# Patient Record
Sex: FEMALE | Race: AMERICAN INDIAN OR ALASKA NATIVE
[De-identification: names, ages, dates, MRNs, and addresses within clinical notes are randomized per-mention and may not be internally consistent; named-entity substitution may affect disease eponyms.]

---

## 2018-04-03 ENCOUNTER — HOSPITAL ENCOUNTER (EMERGENCY)
Dept: HOSPITAL 31 - C.ER | Age: 28
Discharge: HOME | End: 2018-04-03
Payer: COMMERCIAL

## 2018-04-03 VITALS
TEMPERATURE: 97.9 F | RESPIRATION RATE: 18 BRPM | OXYGEN SATURATION: 96 % | HEART RATE: 104 BPM | SYSTOLIC BLOOD PRESSURE: 124 MMHG | DIASTOLIC BLOOD PRESSURE: 70 MMHG

## 2018-04-03 VITALS — BODY MASS INDEX: 23 KG/M2

## 2018-04-03 DIAGNOSIS — R21: Primary | ICD-10-CM

## 2018-04-03 PROCEDURE — 99284 EMERGENCY DEPT VISIT MOD MDM: CPT

## 2018-04-03 PROCEDURE — 96372 THER/PROPH/DIAG INJ SC/IM: CPT

## 2018-04-03 NOTE — C.PDOC
History Of Present Illness


26yo female, presents to ED for evaluation of "hives all over my body" 

associated with redness and itchiness for the last three days. Pt notes she 

started Augmentin and Tessalon perles last week while being treated for a "bad 

cold" by her PCP. She stopped taking the Augmentin and Tesalon Perles Sunday 

morning and developed a rash that day. Patient states she was evaluated at an 

urgent care center yesterday for same complaints, given prednisone of which she 

took 40mg without relief of symptoms, prompting her visit. Denies taking any 

antihistamines. She denies any shortness of breath, mouth swelling, throat 

swelling, difficulty swallowing. No fever.  


Time Seen by Provider: 04/03/18 12:00


Chief Complaint (Nursing): Allergic Reaction


History Per: Patient


History/Exam Limitations: no limitations


Onset/Duration Of Symptoms: Days (3)


Current Symptoms Are (Timing): Still Present


Possible Cause: Medication


Associated Symptoms: Skin Rash, Itching, Redness


Home/EMS Treatment: Steroids


Additional History Per: Patient





Past Medical History


Reviewed: Historical Data, Nursing Documentation, Vital Signs


Vital Signs: 


 Last Vital Signs











Temp  97.9 F   04/03/18 11:36


 


Pulse  104 H  04/03/18 11:36


 


Resp  18   04/03/18 11:36


 


BP  124/70   04/03/18 11:36


 


Pulse Ox  96   04/03/18 12:25














- Medical History


PMH: Anemia


Surgical History: No Surg Hx


Family History: States: Unknown Family Hx





- Social History


Hx Tobacco Use: No


Hx Alcohol Use: No


Hx Substance Use: No





- Immunization History


Hx Tetanus Toxoid Vaccination: No


Hx Influenza Vaccination: No


Hx Pneumococcal Vaccination: No





Review Of Systems


Except As Marked, All Systems Reviewed And Found Negative.


ENT: Negative for: Mouth Swelling, Throat Swelling


Respiratory: Negative for: Shortness of Breath


Skin: Positive for: Rash





Physical Exam





- Physical Exam


Appears: Non-toxic, No Acute Distress


Skin: Dry, Other ((+) mild erythema noted to right lower quadrant of abdomen 

and on bilateral posterior knees secondary to scratching . No hives, papules, 

vesicles or discharge notes. )


Head: Atraumatic, Normacephalic


Eye(s): bilateral: Normal Inspection, PERRL, EOMI


Ear(s): Bilateral: Normal


Nose: Normal


Oral Mucosa: Moist


Tongue: No Swelling


Lips: No Swelling


Throat: Normal, No Erythema, No Exudate, No Drooling


Neck: Normal, Normal ROM, Supple


Chest: Symmetrical


Cardiovascular: Rhythm Regular, No Murmur


Respiratory: Normal Breath Sounds, No Accessory Muscle Use, No Wheezing


Gastrointestinal/Abdominal: Normal Exam, Soft, No Tenderness


Back: Normal Inspection


Extremity: Normal ROM


Neurological/Psych: Oriented x3, Normal Speech





ED Course And Treatment


O2 Sat by Pulse Oximetry: 96 (RA)


Pulse Ox Interpretation: Normal


Progress Note: Patient given Benadryl, Solumedrol and Pepcid in ER.  On re-

evaluation, patient is resting comfortably, tolerating PO, has no shortness of 

breath, has no intra-oral swelling, no stridor. Patient notes that pruritus has 

improved.. Patient was advised to avoid potential allergens, and to follow up 

with physician in 1-2 days.  Instructed to continue using Prednisone as 

prescribed and to take Benadryl every 6 hours for relief of symptoms. Given 

referral for an allergist follow up and instructed to return to ED if symptoms 

worsen.





Disposition





- Disposition


Disposition: HOME/ ROUTINE


Disposition Time: 12:14


Condition: STABLE


Additional Instructions: 


Continue the prednisone prescribed yesterday and start taking Benadryl every 6  

hours. Follow up with an allergist or dermatologist in 1-2 days. Return to ER 

if symptoms persist or worsen. 


Prescriptions: 


DiphenhydrAMINE [Benadryl] 25 mg PO Q6 #20 cap


Famotidine [Pepcid] 20 mg PO BID #10 tab


Instructions:  Skin Rash (DC)


Forms:  [facility] (English)





- Clinical Impression


Clinical Impression: 


 Rash








- PA / NP / Resident Statement


MD/DO has reviewed & agrees with the documentation as recorded.





- Scribe Statement


The provider has reviewed the documentation as recorded by the Scribe (Keturah Ernst)


Provider Attestation:


All medical record entries made by the Scribe were at my direction and 

personally dictated by me. I have reviewed the chart and agree that the record 

accurately reflects my personal performance of the history, physical exam, 

medical decision making, and the department course for this patient. I have 

also personally directed, reviewed, and agree with the discharge instructions 

and disposition.

## 2023-02-16 VITALS
WEIGHT: 130.95 LBS | OXYGEN SATURATION: 100 % | TEMPERATURE: 98 F | SYSTOLIC BLOOD PRESSURE: 109 MMHG | HEIGHT: 63 IN | HEART RATE: 104 BPM | RESPIRATION RATE: 18 BRPM | DIASTOLIC BLOOD PRESSURE: 70 MMHG

## 2023-02-16 NOTE — ASU PATIENT PROFILE, ADULT - REASON FOR ADMISSION, PROFILE
Robotic excision fo endometriosis tissue, cervical biopsy, laparoscopic lower anterior resection with possible shave excision rectal nodule ,

## 2023-02-16 NOTE — ASU PATIENT PROFILE, ADULT - NSICDXPASTMEDICALHX_GEN_ALL_CORE_FT
PAST MEDICAL HISTORY:  Asthma as a child    Endometriosis     Kidney infection     Uterine fibroid

## 2023-02-16 NOTE — ASU PATIENT PROFILE, ADULT - FALL HARM RISK - UNIVERSAL INTERVENTIONS
Bed in lowest position, wheels locked, appropriate side rails in place/Call bell, personal items and telephone in reach/Instruct patient to call for assistance before getting out of bed or chair/Non-slip footwear when patient is out of bed/[place] to call system/Physically safe environment - no spills, clutter or unnecessary equipment/Purposeful Proactive Rounding/Room/bathroom lighting operational, light cord in reach

## 2023-02-16 NOTE — ASU PATIENT PROFILE, ADULT - NSICDXPASTSURGICALHX_GEN_ALL_CORE_FT
PAST SURGICAL HISTORY:  Elective surgery benign mass removal from b/l breast    Elective surgery pinky finger surgery, right    H/O myomectomy

## 2023-02-16 NOTE — ASU PATIENT PROFILE, ADULT - NS PRO AD PATIENT TYPE
brother Paolo [facility], [phone]/Health Care Proxy (HCP)

## 2023-02-17 ENCOUNTER — INPATIENT (INPATIENT)
Facility: HOSPITAL | Age: 33
LOS: 3 days | Discharge: HOME | DRG: 330 | End: 2023-02-21
Attending: SURGERY | Admitting: SURGERY
Payer: COMMERCIAL

## 2023-02-17 DIAGNOSIS — Z41.9 ENCOUNTER FOR PROCEDURE FOR PURPOSES OTHER THAN REMEDYING HEALTH STATE, UNSPECIFIED: Chronic | ICD-10-CM

## 2023-02-17 DIAGNOSIS — Z98.890 OTHER SPECIFIED POSTPROCEDURAL STATES: Chronic | ICD-10-CM

## 2023-02-17 LAB
ANION GAP SERPL CALC-SCNC: 11 MMOL/L — SIGNIFICANT CHANGE UP (ref 5–17)
APTT BLD: 33.7 SEC — SIGNIFICANT CHANGE UP (ref 27.5–35.5)
BLD GP AB SCN SERPL QL: NEGATIVE — SIGNIFICANT CHANGE UP
BUN SERPL-MCNC: 9 MG/DL — SIGNIFICANT CHANGE UP (ref 7–23)
CALCIUM SERPL-MCNC: 8.6 MG/DL — SIGNIFICANT CHANGE UP (ref 8.4–10.5)
CHLORIDE SERPL-SCNC: 102 MMOL/L — SIGNIFICANT CHANGE UP (ref 96–108)
CO2 SERPL-SCNC: 24 MMOL/L — SIGNIFICANT CHANGE UP (ref 22–31)
CREAT SERPL-MCNC: 0.74 MG/DL — SIGNIFICANT CHANGE UP (ref 0.5–1.3)
EGFR: 109 ML/MIN/1.73M2 — SIGNIFICANT CHANGE UP
GLUCOSE BLDC GLUCOMTR-MCNC: 87 MG/DL — SIGNIFICANT CHANGE UP (ref 70–99)
GLUCOSE SERPL-MCNC: 181 MG/DL — HIGH (ref 70–99)
HCT VFR BLD CALC: 28.4 % — LOW (ref 34.5–45)
HCT VFR BLD CALC: 31.6 % — LOW (ref 34.5–45)
HGB BLD-MCNC: 8.5 G/DL — LOW (ref 11.5–15.5)
HGB BLD-MCNC: 9.3 G/DL — LOW (ref 11.5–15.5)
INR BLD: 1.09 — SIGNIFICANT CHANGE UP (ref 0.88–1.16)
MAGNESIUM SERPL-MCNC: 1.8 MG/DL — SIGNIFICANT CHANGE UP (ref 1.6–2.6)
MCHC RBC-ENTMCNC: 21.4 PG — LOW (ref 27–34)
MCHC RBC-ENTMCNC: 21.6 PG — LOW (ref 27–34)
MCHC RBC-ENTMCNC: 29.4 GM/DL — LOW (ref 32–36)
MCHC RBC-ENTMCNC: 29.9 GM/DL — LOW (ref 32–36)
MCV RBC AUTO: 72.3 FL — LOW (ref 80–100)
MCV RBC AUTO: 72.8 FL — LOW (ref 80–100)
NRBC # BLD: 0 /100 WBCS — SIGNIFICANT CHANGE UP (ref 0–0)
NRBC # BLD: 0 /100 WBCS — SIGNIFICANT CHANGE UP (ref 0–0)
PHOSPHATE SERPL-MCNC: 4 MG/DL — SIGNIFICANT CHANGE UP (ref 2.5–4.5)
PLATELET # BLD AUTO: 448 K/UL — HIGH (ref 150–400)
PLATELET # BLD AUTO: 513 K/UL — HIGH (ref 150–400)
POTASSIUM SERPL-MCNC: 4.6 MMOL/L — SIGNIFICANT CHANGE UP (ref 3.5–5.3)
POTASSIUM SERPL-SCNC: 4.6 MMOL/L — SIGNIFICANT CHANGE UP (ref 3.5–5.3)
PROTHROM AB SERPL-ACNC: 13 SEC — SIGNIFICANT CHANGE UP (ref 10.5–13.4)
RBC # BLD: 3.93 M/UL — SIGNIFICANT CHANGE UP (ref 3.8–5.2)
RBC # BLD: 4.34 M/UL — SIGNIFICANT CHANGE UP (ref 3.8–5.2)
RBC # FLD: 16.3 % — HIGH (ref 10.3–14.5)
RBC # FLD: 16.5 % — HIGH (ref 10.3–14.5)
RH IG SCN BLD-IMP: NEGATIVE — SIGNIFICANT CHANGE UP
SODIUM SERPL-SCNC: 137 MMOL/L — SIGNIFICANT CHANGE UP (ref 135–145)
WBC # BLD: 15.07 K/UL — HIGH (ref 3.8–10.5)
WBC # BLD: 8.07 K/UL — SIGNIFICANT CHANGE UP (ref 3.8–10.5)
WBC # FLD AUTO: 15.07 K/UL — HIGH (ref 3.8–10.5)
WBC # FLD AUTO: 8.07 K/UL — SIGNIFICANT CHANGE UP (ref 3.8–10.5)

## 2023-02-17 PROCEDURE — 88307 TISSUE EXAM BY PATHOLOGIST: CPT | Mod: 26

## 2023-02-17 PROCEDURE — 88304 TISSUE EXAM BY PATHOLOGIST: CPT | Mod: 26

## 2023-02-17 PROCEDURE — 88305 TISSUE EXAM BY PATHOLOGIST: CPT | Mod: 26

## 2023-02-17 DEVICE — SURGICEL POWDER 3 GRAMS: Type: IMPLANTABLE DEVICE | Site: ABDOMINAL | Status: FUNCTIONAL

## 2023-02-17 DEVICE — STAPLER ECHELON CIRCULAR POWERED 29MM: Type: IMPLANTABLE DEVICE | Site: ABDOMINAL | Status: FUNCTIONAL

## 2023-02-17 DEVICE — XI STAPLER SUREFORM RELOAD 60 BLUE: Type: IMPLANTABLE DEVICE | Site: ABDOMINAL | Status: FUNCTIONAL

## 2023-02-17 DEVICE — STAPLER COVIDIEN TRI-STAPLE 60MM PURPLE RELOAD: Type: IMPLANTABLE DEVICE | Site: ABDOMINAL | Status: FUNCTIONAL

## 2023-02-17 DEVICE — PATCH EVARREST FIBRIN SEALANT 2X4CM: Type: IMPLANTABLE DEVICE | Site: ABDOMINAL | Status: FUNCTIONAL

## 2023-02-17 RX ORDER — BENZOCAINE AND MENTHOL 5; 1 G/100ML; G/100ML
1 LIQUID ORAL ONCE
Refills: 0 | Status: COMPLETED | OUTPATIENT
Start: 2023-02-17 | End: 2023-02-17

## 2023-02-17 RX ORDER — PHENAZOPYRIDINE HCL 100 MG
200 TABLET ORAL ONCE
Refills: 0 | Status: COMPLETED | OUTPATIENT
Start: 2023-02-17 | End: 2023-02-17

## 2023-02-17 RX ORDER — HEPARIN SODIUM 5000 [USP'U]/ML
5000 INJECTION INTRAVENOUS; SUBCUTANEOUS ONCE
Refills: 0 | Status: DISCONTINUED | OUTPATIENT
Start: 2023-02-17 | End: 2023-02-17

## 2023-02-17 RX ORDER — ACETAMINOPHEN 500 MG
1000 TABLET ORAL ONCE
Refills: 0 | Status: COMPLETED | OUTPATIENT
Start: 2023-02-17 | End: 2023-02-17

## 2023-02-17 RX ORDER — ACETAMINOPHEN 500 MG
1000 TABLET ORAL ONCE
Refills: 0 | Status: DISCONTINUED | OUTPATIENT
Start: 2023-02-17 | End: 2023-02-17

## 2023-02-17 RX ORDER — HYDROMORPHONE HYDROCHLORIDE 2 MG/ML
0.2 INJECTION INTRAMUSCULAR; INTRAVENOUS; SUBCUTANEOUS EVERY 4 HOURS
Refills: 0 | Status: DISCONTINUED | OUTPATIENT
Start: 2023-02-17 | End: 2023-02-18

## 2023-02-17 RX ORDER — GABAPENTIN 400 MG/1
300 CAPSULE ORAL ONCE
Refills: 0 | Status: DISCONTINUED | OUTPATIENT
Start: 2023-02-17 | End: 2023-02-17

## 2023-02-17 RX ORDER — MAGNESIUM SULFATE 500 MG/ML
1 VIAL (ML) INJECTION ONCE
Refills: 0 | Status: COMPLETED | OUTPATIENT
Start: 2023-02-17 | End: 2023-02-17

## 2023-02-17 RX ORDER — CIPROFLOXACIN LACTATE 400MG/40ML
1 VIAL (ML) INTRAVENOUS
Qty: 0 | Refills: 0 | DISCHARGE

## 2023-02-17 RX ORDER — METRONIDAZOLE 500 MG
1 TABLET ORAL
Qty: 0 | Refills: 0 | DISCHARGE

## 2023-02-17 RX ORDER — SODIUM CHLORIDE 9 MG/ML
500 INJECTION, SOLUTION INTRAVENOUS ONCE
Refills: 0 | Status: COMPLETED | OUTPATIENT
Start: 2023-02-17 | End: 2023-02-17

## 2023-02-17 RX ORDER — HEPARIN SODIUM 5000 [USP'U]/ML
5000 INJECTION INTRAVENOUS; SUBCUTANEOUS ONCE
Refills: 0 | Status: COMPLETED | OUTPATIENT
Start: 2023-02-17 | End: 2023-02-17

## 2023-02-17 RX ORDER — ONDANSETRON 8 MG/1
4 TABLET, FILM COATED ORAL EVERY 8 HOURS
Refills: 0 | Status: DISCONTINUED | OUTPATIENT
Start: 2023-02-17 | End: 2023-02-21

## 2023-02-17 RX ORDER — ACETAMINOPHEN 500 MG
1000 TABLET ORAL EVERY 6 HOURS
Refills: 0 | Status: DISCONTINUED | OUTPATIENT
Start: 2023-02-18 | End: 2023-02-21

## 2023-02-17 RX ORDER — SODIUM CHLORIDE 9 MG/ML
1000 INJECTION, SOLUTION INTRAVENOUS
Refills: 0 | Status: DISCONTINUED | OUTPATIENT
Start: 2023-02-17 | End: 2023-02-19

## 2023-02-17 RX ORDER — HYDROMORPHONE HYDROCHLORIDE 2 MG/ML
0.4 INJECTION INTRAMUSCULAR; INTRAVENOUS; SUBCUTANEOUS EVERY 4 HOURS
Refills: 0 | Status: DISCONTINUED | OUTPATIENT
Start: 2023-02-17 | End: 2023-02-18

## 2023-02-17 RX ORDER — CELECOXIB 200 MG/1
400 CAPSULE ORAL ONCE
Refills: 0 | Status: DISCONTINUED | OUTPATIENT
Start: 2023-02-17 | End: 2023-02-17

## 2023-02-17 RX ORDER — ACETAMINOPHEN 500 MG
1000 TABLET ORAL EVERY 6 HOURS
Refills: 0 | Status: DISCONTINUED | OUTPATIENT
Start: 2023-02-17 | End: 2023-02-17

## 2023-02-17 RX ADMIN — Medication 400 MILLIGRAM(S): at 23:20

## 2023-02-17 RX ADMIN — Medication 1000 MILLIGRAM(S): at 23:50

## 2023-02-17 RX ADMIN — SODIUM CHLORIDE 1000 MILLILITER(S): 9 INJECTION, SOLUTION INTRAVENOUS at 18:59

## 2023-02-17 RX ADMIN — Medication 1000 MILLIGRAM(S): at 07:23

## 2023-02-17 RX ADMIN — SODIUM CHLORIDE 1000 MILLILITER(S): 9 INJECTION, SOLUTION INTRAVENOUS at 20:25

## 2023-02-17 RX ADMIN — Medication 400 MILLIGRAM(S): at 17:00

## 2023-02-17 RX ADMIN — Medication 100 GRAM(S): at 23:20

## 2023-02-17 RX ADMIN — BENZOCAINE AND MENTHOL 1 LOZENGE: 5; 1 LIQUID ORAL at 23:20

## 2023-02-17 RX ADMIN — HEPARIN SODIUM 5000 UNIT(S): 5000 INJECTION INTRAVENOUS; SUBCUTANEOUS at 07:26

## 2023-02-17 RX ADMIN — Medication 200 MILLIGRAM(S): at 06:52

## 2023-02-17 NOTE — CHART NOTE - NSCHARTNOTEFT_GEN_A_CORE
Notified at 6:45pm as patient in afebrile, sinus tachycardia to 130, bp 130S, sat 100% on nonbreather,  since procedure. She is AAOX3, denies abdominal pain, denies chest pain/dyspnea.      On exam: lungs CTAB  Abdomen: soft, mild distention, appropriate incisional tenderness, no rebound, no guarding, no signs of echymosis    She is currently receiving 1L bolus, post op labs sent. (Preop Hg 9.3). Discussed with Dr. Maradiaga and Dr. Chandra, will continue to monitor closely with plan pending cbc results

## 2023-02-17 NOTE — CHART NOTE - NSCHARTNOTEFT_GEN_A_CORE
Procedure:    Surgeon:    S:      O:  T(C): --  T(F): --  HR: 120 (02-17-23 @ 18:06) (108 - 133)  BP: 128/68 (02-17-23 @ 18:06) (128/68 - 158/67)  RR: 17 (02-17-23 @ 18:06) (16 - 18)  SpO2: 100% (02-17-23 @ 18:06) (99% - 100%)  Wt(kg): --                        9.3    8.07  )-----------( 513      ( 17 Feb 2023 07:10 )             31.6             Gen: NAD, resting comfortably in bed  C/V: NSR  Pulm: Nonlabored breathing, no respiratory distress  Abd: soft, NT/ND Incision:  Extrem: WWP, no calf edema, SCDs in place      A/P: 32yFemale s/p above procedure  Diet:  IVF:  Pain/nausea control  DVT ppx:  Dispo plan: Procedure: RA laparoscopic endometriosis excision, myomectomy, cervical biopsy, low anterior resection   Surgeon: Dr. Maradiaga     Pt seen and examined at bedside in PACU. Noted that patient was tachycardic 120-130s on telemetry monitor. Denies CP, SOB, or palpitations, nausea, vomiting, calf tenderness, or edema. Reports appropriate postoperative pain. Discussed with chief resident, plan to get STAT labs, EKG, and 1L bolus.     Vitals as below:   HR: 120 (02-17-23 @ 18:06) (108 - 133)  BP: 128/68 (02-17-23 @ 18:06) (128/68 - 158/67)  RR: 17 (02-17-23 @ 18:06) (16 - 18)  SpO2: 100% (02-17-23 @ 18:06) (99% - 100%)      Postop Labs collected due to patient tachycardic 130s postop:              8.5    15.07 )-----------( 448      ( 17 Feb 2023 19:13 )             28.4     Preop Labs:                9.3    8.07  )-----------( 513      ( 17 Feb 2023 07:10 )             31.6       Gen: NAD, resting comfortably in bed  C/V: NSR  Pulm: Nonlabored breathing, no respiratory distress  Abd: soft, NT/ND Incision: sites c/d/i   : rogers with minimal output   Extrem: WWP, no calf edema or tenderness, SCDs in place       Assessment/Plan:   32F PMH endometriosis with rectal implants, PSHx myomectomy now s/p endometriosis excision, myomectomy, low anterior resection.    CLD/LR@40  SCD/SQH  IS/OOBA Procedure: RA laparoscopic endometriosis excision, myomectomy, cervical biopsy, low anterior resection   Surgeon: Dr. Maradiaga     Pt seen and examined at bedside in PACU. Noted that patient was tachycardic 120-130s on telemetry monitor. Denies CP, SOB, or palpitations, nausea, vomiting, calf tenderness, or edema. Reports appropriate postoperative pain. Discussed with chief resident, plan to get STAT labs, EKG, and 1L bolus.      Vitals as below:   HR: 120 (02-17-23 @ 18:06) (108 - 133)  BP: 128/68 (02-17-23 @ 18:06) (128/68 - 158/67)  RR: 17 (02-17-23 @ 18:06) (16 - 18)  SpO2: 100% (02-17-23 @ 18:06) (99% - 100%)      Postop Labs collected due to patient tachycardic 130s postop:              8.5    15.07 )-----------( 448      ( 17 Feb 2023 19:13 )             28.4     Preop Labs:                9.3    8.07  )-----------( 513      ( 17 Feb 2023 07:10 )             31.6       Gen: NAD, resting comfortably in bed  C/V: NSR  Pulm: Nonlabored breathing, no respiratory distress  Abd: soft, NT/ND Incision: sites c/d/i   : rogers with minimal output   Extrem: WWP, no calf edema or tenderness, SCDs in place     After 1L bolus administered, HR improved to low 100s, remains asymptomatic throughout the shift. EKG showing sinus tachycardia, w/ no ischemic changes and no ST elevations.     Assessment/Plan:   32F PMH endometriosis with rectal implants, PSHx myomectomy now s/p endometriosis excision, myomectomy, low anterior resection.    Admit to tele   CLD/LR@40  Monitor tachycardia  Monitor I&Os - rogers   SCD/SQH  IS/OOBA

## 2023-02-17 NOTE — BRIEF OPERATIVE NOTE - OPERATION/FINDINGS
Endometriosis of pelvic peritoneum, left ovary, vagina, sigmoid colon, extensive adhesions between uterus and bladder  Uterine fibroid approximately 2-3cm  See full dictation of GYN and General Surgery components of surgery

## 2023-02-17 NOTE — H&P ADULT - NSHPPHYSICALEXAM_GEN_ALL_CORE
VSS    Physical Exam  General: NAD, resting comfortably in bed  Pulm: Nonlabored breathing, no respiratory distress  Abd: soft, ND, well healed pfannenstiel  Extrem: WWP, no edema,  Neuro: A/O x 3, CNs II-XII grossly intact, no focal deficits, normal sensation  Pulses: palpable distal pulses

## 2023-02-17 NOTE — H&P ADULT - HISTORY OF PRESENT ILLNESS
32F PMH endometriosis with rectal implants, PSHx myomectomy presents for endometriosis excision, myomectomy, low anterior resection.    2/17: ROSA laparoscopic endometriosis excision, myomectomy, cervical biopsy, low anterior resection

## 2023-02-17 NOTE — BRIEF OPERATIVE NOTE - NSICDXBRIEFPROCEDURE_GEN_ALL_CORE_FT
PROCEDURES:  Robot-assisted laparoscopic excision of endometriosis 17-Feb-2023 15:25:46  Gildardo Baker  Robot-assisted low anterior resection of bowel 17-Feb-2023 16:22:32  Karen Butler  Exploration, wound, abdominal 17-Feb-2023 16:22:54 w/ excision of nodule Karen Butler

## 2023-02-17 NOTE — PATIENT PROFILE ADULT - FALL HARM RISK - HARM RISK INTERVENTIONS

## 2023-02-17 NOTE — PROGRESS NOTE ADULT - ASSESSMENT
32F PMH endometriosis with rectal implants, PSHx myomectomy presents for endometriosis excision, myomectomy, low anterior resection.  Post OP check done. The patient is stable and doing well. Gyn continue to follow  Care per primary team

## 2023-02-17 NOTE — H&P ADULT - ASSESSMENT
32F PMH endometriosis with rectal implants, PSHx myomectomy presents for endometriosis excision, myomectomy, low anterior resection.    CLD/LR@40  SCD/SQH  IS/OOB  AM labs  Team 1  Dr. Maradiaga

## 2023-02-17 NOTE — BRIEF OPERATIVE NOTE - NSICDXBRIEFPOSTOP_GEN_ALL_CORE_FT
POST-OP DIAGNOSIS:  Endometriosis of the pelvic peritoneum, other specified sites, unspecified depth 17-Feb-2023 15:27:29  Gildardo Baker  
POST-OP DIAGNOSIS:  Endometriosis of the pelvic peritoneum, other specified sites, unspecified depth 17-Feb-2023 15:27:29  Gildardo Baker  Abnormal uterine and vaginal bleeding 17-Feb-2023 15:27:37  Gildardo Baker

## 2023-02-17 NOTE — BRIEF OPERATIVE NOTE - NSICDXBRIEFPROCEDURE_GEN_ALL_CORE_FT
PROCEDURES:  Robot-assisted laparoscopic excision of endometriosis 17-Feb-2023 15:25:46  Gildardo Baker  Myomectomy, uterus, robot-assisted, laparoscopic, with cystoscopy 17-Feb-2023 15:26:20  Gildardo Baker  Fulguration, ovary 17-Feb-2023 15:23:46  Gildardo aBker  Biopsy, cervical punch 17-Feb-2023 15:24:57  Gildardo Baker

## 2023-02-17 NOTE — PATIENT PROFILE ADULT - FUNCTIONAL ASSESSMENT - BASIC MOBILITY 6.
3-calculated by average/Not able to assess (calculate score using [facility] averaging method)

## 2023-02-17 NOTE — BRIEF OPERATIVE NOTE - OPERATION/FINDINGS
Mobilized rectum bilaterally laparoscopically w/ care to preserve ureters. Endometriosis excision, myomectomy, cervical biopsy per gyn. Dr. Chandra assisted w/ endometriosis excision. Mobilized rectum circumferentially robotically. Distal staple line creating rectal stump using robot linear cutting stapler blue loads x2. Mobilized sigmoid/distal descending with low ligation of vasculature.     *incomplete Mobilized rectum bilaterally laparoscopically w/ care to preserve ureters. Endometriosis excision, myomectomy, cervical biopsy per gyn. Dr. Chandra assisted w/ endometriosis excision. Mobilized rectum circumferentially robotically. Distal staple line creating rectal stump using robot linear cutting stapler blue loads x2. Mobilized sigmoid/distal descending with low ligation of vasculature. Pfannenstiel incision at prior pfannenstiel site. Extracorporealized at proximal staple line, and resected distal end and portion including endometrial disease. Anvil placed in end and secured with purstring. EEA stapler introduced to rectum. End-to-end anastomosis created after confirming that bowel was not on tension and not twisted. Examined staple line endoscopically. Cystoscopy per gyn no ureter injury. Closed 12mm robotic trocar site fascia using figure 8 vicryl. Wound exploration of pfannenstiel w/ excision of nodule. Closing tray Peritoneum closed, fascia closed 2-0 vicryl. Running subcuticular skin closure 4-0 monocryl, dermabond.    *incomplete

## 2023-02-18 LAB
ANION GAP SERPL CALC-SCNC: 8 MMOL/L — SIGNIFICANT CHANGE UP (ref 5–17)
BUN SERPL-MCNC: 6 MG/DL — LOW (ref 7–23)
CALCIUM SERPL-MCNC: 8.6 MG/DL — SIGNIFICANT CHANGE UP (ref 8.4–10.5)
CHLORIDE SERPL-SCNC: 107 MMOL/L — SIGNIFICANT CHANGE UP (ref 96–108)
CO2 SERPL-SCNC: 26 MMOL/L — SIGNIFICANT CHANGE UP (ref 22–31)
CREAT SERPL-MCNC: 0.59 MG/DL — SIGNIFICANT CHANGE UP (ref 0.5–1.3)
EGFR: 122 ML/MIN/1.73M2 — SIGNIFICANT CHANGE UP
GLUCOSE SERPL-MCNC: 86 MG/DL — SIGNIFICANT CHANGE UP (ref 70–99)
HCT VFR BLD CALC: 26.3 % — LOW (ref 34.5–45)
HCT VFR BLD CALC: 27.4 % — LOW (ref 34.5–45)
HGB BLD-MCNC: 7.8 G/DL — LOW (ref 11.5–15.5)
HGB BLD-MCNC: 8.1 G/DL — LOW (ref 11.5–15.5)
MAGNESIUM SERPL-MCNC: 2 MG/DL — SIGNIFICANT CHANGE UP (ref 1.6–2.6)
MCHC RBC-ENTMCNC: 21.4 PG — LOW (ref 27–34)
MCHC RBC-ENTMCNC: 21.7 PG — LOW (ref 27–34)
MCHC RBC-ENTMCNC: 29.6 GM/DL — LOW (ref 32–36)
MCHC RBC-ENTMCNC: 29.7 GM/DL — LOW (ref 32–36)
MCV RBC AUTO: 72.3 FL — LOW (ref 80–100)
MCV RBC AUTO: 73.3 FL — LOW (ref 80–100)
NRBC # BLD: 0 /100 WBCS — SIGNIFICANT CHANGE UP (ref 0–0)
NRBC # BLD: 0 /100 WBCS — SIGNIFICANT CHANGE UP (ref 0–0)
PHOSPHATE SERPL-MCNC: 3.2 MG/DL — SIGNIFICANT CHANGE UP (ref 2.5–4.5)
PLATELET # BLD AUTO: 411 K/UL — HIGH (ref 150–400)
PLATELET # BLD AUTO: 443 K/UL — HIGH (ref 150–400)
POTASSIUM SERPL-MCNC: 4 MMOL/L — SIGNIFICANT CHANGE UP (ref 3.5–5.3)
POTASSIUM SERPL-SCNC: 4 MMOL/L — SIGNIFICANT CHANGE UP (ref 3.5–5.3)
RBC # BLD: 3.59 M/UL — LOW (ref 3.8–5.2)
RBC # BLD: 3.79 M/UL — LOW (ref 3.8–5.2)
RBC # FLD: 16.3 % — HIGH (ref 10.3–14.5)
RBC # FLD: 16.4 % — HIGH (ref 10.3–14.5)
SODIUM SERPL-SCNC: 141 MMOL/L — SIGNIFICANT CHANGE UP (ref 135–145)
WBC # BLD: 11.18 K/UL — HIGH (ref 3.8–10.5)
WBC # BLD: 12.98 K/UL — HIGH (ref 3.8–10.5)
WBC # FLD AUTO: 11.18 K/UL — HIGH (ref 3.8–10.5)
WBC # FLD AUTO: 12.98 K/UL — HIGH (ref 3.8–10.5)

## 2023-02-18 RX ORDER — HYDROMORPHONE HYDROCHLORIDE 2 MG/ML
0.25 INJECTION INTRAMUSCULAR; INTRAVENOUS; SUBCUTANEOUS EVERY 6 HOURS
Refills: 0 | Status: DISCONTINUED | OUTPATIENT
Start: 2023-02-18 | End: 2023-02-18

## 2023-02-18 RX ORDER — NITROFURANTOIN MACROCRYSTAL 50 MG
0 CAPSULE ORAL
Qty: 0 | Refills: 0 | DISCHARGE

## 2023-02-18 RX ORDER — SODIUM CHLORIDE 9 MG/ML
500 INJECTION, SOLUTION INTRAVENOUS ONCE
Refills: 0 | Status: COMPLETED | OUTPATIENT
Start: 2023-02-18 | End: 2023-02-18

## 2023-02-18 RX ORDER — HYDROMORPHONE HYDROCHLORIDE 2 MG/ML
30 INJECTION INTRAMUSCULAR; INTRAVENOUS; SUBCUTANEOUS
Refills: 0 | Status: DISCONTINUED | OUTPATIENT
Start: 2023-02-18 | End: 2023-02-20

## 2023-02-18 RX ORDER — ACETAMINOPHEN 500 MG
1000 TABLET ORAL ONCE
Refills: 0 | Status: COMPLETED | OUTPATIENT
Start: 2023-02-18 | End: 2023-02-18

## 2023-02-18 RX ORDER — SIMETHICONE 80 MG/1
80 TABLET, CHEWABLE ORAL ONCE
Refills: 0 | Status: COMPLETED | OUTPATIENT
Start: 2023-02-18 | End: 2023-02-18

## 2023-02-18 RX ORDER — HYDROMORPHONE HYDROCHLORIDE 2 MG/ML
0.25 INJECTION INTRAMUSCULAR; INTRAVENOUS; SUBCUTANEOUS ONCE
Refills: 0 | Status: DISCONTINUED | OUTPATIENT
Start: 2023-02-18 | End: 2023-02-18

## 2023-02-18 RX ORDER — HYDROMORPHONE HYDROCHLORIDE 2 MG/ML
0.5 INJECTION INTRAMUSCULAR; INTRAVENOUS; SUBCUTANEOUS EVERY 6 HOURS
Refills: 0 | Status: DISCONTINUED | OUTPATIENT
Start: 2023-02-18 | End: 2023-02-18

## 2023-02-18 RX ADMIN — ONDANSETRON 4 MILLIGRAM(S): 8 TABLET, FILM COATED ORAL at 04:08

## 2023-02-18 RX ADMIN — HYDROMORPHONE HYDROCHLORIDE 30 MILLILITER(S): 2 INJECTION INTRAMUSCULAR; INTRAVENOUS; SUBCUTANEOUS at 09:53

## 2023-02-18 RX ADMIN — Medication 400 MILLIGRAM(S): at 05:00

## 2023-02-18 RX ADMIN — Medication 1000 MILLIGRAM(S): at 12:02

## 2023-02-18 RX ADMIN — Medication 1000 MILLIGRAM(S): at 05:49

## 2023-02-18 RX ADMIN — SODIUM CHLORIDE 1000 MILLILITER(S): 9 INJECTION, SOLUTION INTRAVENOUS at 00:42

## 2023-02-18 RX ADMIN — Medication 1000 MILLIGRAM(S): at 18:46

## 2023-02-18 RX ADMIN — HYDROMORPHONE HYDROCHLORIDE 0.5 MILLIGRAM(S): 2 INJECTION INTRAMUSCULAR; INTRAVENOUS; SUBCUTANEOUS at 04:15

## 2023-02-18 RX ADMIN — HYDROMORPHONE HYDROCHLORIDE 0.25 MILLIGRAM(S): 2 INJECTION INTRAMUSCULAR; INTRAVENOUS; SUBCUTANEOUS at 09:08

## 2023-02-18 RX ADMIN — HYDROMORPHONE HYDROCHLORIDE 0.25 MILLIGRAM(S): 2 INJECTION INTRAMUSCULAR; INTRAVENOUS; SUBCUTANEOUS at 09:25

## 2023-02-18 RX ADMIN — HYDROMORPHONE HYDROCHLORIDE 0.5 MILLIGRAM(S): 2 INJECTION INTRAMUSCULAR; INTRAVENOUS; SUBCUTANEOUS at 03:39

## 2023-02-18 RX ADMIN — SIMETHICONE 80 MILLIGRAM(S): 80 TABLET, CHEWABLE ORAL at 09:07

## 2023-02-18 NOTE — PROGRESS NOTE ADULT - ASSESSMENT
32F PMH endometriosis with rectal implants, POD1 s/p r/a endometriosis excision, myomectomy, low anterior resection.\    - maintain active type and screen, transfuse H>7.0  - repleat electrolytes as needed  - care per primary team

## 2023-02-19 LAB
ANION GAP SERPL CALC-SCNC: 8 MMOL/L — SIGNIFICANT CHANGE UP (ref 5–17)
BUN SERPL-MCNC: 5 MG/DL — LOW (ref 7–23)
CALCIUM SERPL-MCNC: 8.6 MG/DL — SIGNIFICANT CHANGE UP (ref 8.4–10.5)
CHLORIDE SERPL-SCNC: 103 MMOL/L — SIGNIFICANT CHANGE UP (ref 96–108)
CO2 SERPL-SCNC: 26 MMOL/L — SIGNIFICANT CHANGE UP (ref 22–31)
CREAT SERPL-MCNC: 0.55 MG/DL — SIGNIFICANT CHANGE UP (ref 0.5–1.3)
EGFR: 124 ML/MIN/1.73M2 — SIGNIFICANT CHANGE UP
GLUCOSE SERPL-MCNC: 78 MG/DL — SIGNIFICANT CHANGE UP (ref 70–99)
HCT VFR BLD CALC: 25.3 % — LOW (ref 34.5–45)
HCT VFR BLD CALC: 25.7 % — LOW (ref 34.5–45)
HCT VFR BLD CALC: 25.8 % — LOW (ref 34.5–45)
HGB BLD-MCNC: 7.4 G/DL — LOW (ref 11.5–15.5)
HGB BLD-MCNC: 7.6 G/DL — LOW (ref 11.5–15.5)
HGB BLD-MCNC: 7.6 G/DL — LOW (ref 11.5–15.5)
MAGNESIUM SERPL-MCNC: 1.8 MG/DL — SIGNIFICANT CHANGE UP (ref 1.6–2.6)
MCHC RBC-ENTMCNC: 21.3 PG — LOW (ref 27–34)
MCHC RBC-ENTMCNC: 21.4 PG — LOW (ref 27–34)
MCHC RBC-ENTMCNC: 21.6 PG — LOW (ref 27–34)
MCHC RBC-ENTMCNC: 29.2 GM/DL — LOW (ref 32–36)
MCHC RBC-ENTMCNC: 29.5 GM/DL — LOW (ref 32–36)
MCHC RBC-ENTMCNC: 29.6 GM/DL — LOW (ref 32–36)
MCV RBC AUTO: 72.3 FL — LOW (ref 80–100)
MCV RBC AUTO: 72.4 FL — LOW (ref 80–100)
MCV RBC AUTO: 74 FL — LOW (ref 80–100)
NRBC # BLD: 0 /100 WBCS — SIGNIFICANT CHANGE UP (ref 0–0)
PHOSPHATE SERPL-MCNC: 2.9 MG/DL — SIGNIFICANT CHANGE UP (ref 2.5–4.5)
PLATELET # BLD AUTO: 376 K/UL — SIGNIFICANT CHANGE UP (ref 150–400)
PLATELET # BLD AUTO: 379 K/UL — SIGNIFICANT CHANGE UP (ref 150–400)
PLATELET # BLD AUTO: 389 K/UL — SIGNIFICANT CHANGE UP (ref 150–400)
POTASSIUM SERPL-MCNC: 4 MMOL/L — SIGNIFICANT CHANGE UP (ref 3.5–5.3)
POTASSIUM SERPL-SCNC: 4 MMOL/L — SIGNIFICANT CHANGE UP (ref 3.5–5.3)
RBC # BLD: 3.42 M/UL — LOW (ref 3.8–5.2)
RBC # BLD: 3.55 M/UL — LOW (ref 3.8–5.2)
RBC # BLD: 3.57 M/UL — LOW (ref 3.8–5.2)
RBC # FLD: 16.1 % — HIGH (ref 10.3–14.5)
RBC # FLD: 16.1 % — HIGH (ref 10.3–14.5)
RBC # FLD: 16.5 % — HIGH (ref 10.3–14.5)
SODIUM SERPL-SCNC: 137 MMOL/L — SIGNIFICANT CHANGE UP (ref 135–145)
WBC # BLD: 10.33 K/UL — SIGNIFICANT CHANGE UP (ref 3.8–10.5)
WBC # BLD: 9.16 K/UL — SIGNIFICANT CHANGE UP (ref 3.8–10.5)
WBC # BLD: 9.95 K/UL — SIGNIFICANT CHANGE UP (ref 3.8–10.5)
WBC # FLD AUTO: 10.33 K/UL — SIGNIFICANT CHANGE UP (ref 3.8–10.5)
WBC # FLD AUTO: 9.16 K/UL — SIGNIFICANT CHANGE UP (ref 3.8–10.5)
WBC # FLD AUTO: 9.95 K/UL — SIGNIFICANT CHANGE UP (ref 3.8–10.5)

## 2023-02-19 PROCEDURE — 93010 ELECTROCARDIOGRAM REPORT: CPT

## 2023-02-19 PROCEDURE — 76705 ECHO EXAM OF ABDOMEN: CPT | Mod: 26

## 2023-02-19 RX ORDER — SIMETHICONE 80 MG/1
80 TABLET, CHEWABLE ORAL ONCE
Refills: 0 | Status: COMPLETED | OUTPATIENT
Start: 2023-02-19 | End: 2023-02-19

## 2023-02-19 RX ORDER — SODIUM CHLORIDE 9 MG/ML
1000 INJECTION, SOLUTION INTRAVENOUS
Refills: 0 | Status: DISCONTINUED | OUTPATIENT
Start: 2023-02-19 | End: 2023-02-20

## 2023-02-19 RX ORDER — ALBUMIN HUMAN 25 %
250 VIAL (ML) INTRAVENOUS ONCE
Refills: 0 | Status: COMPLETED | OUTPATIENT
Start: 2023-02-19 | End: 2023-02-19

## 2023-02-19 RX ORDER — MAGNESIUM SULFATE 500 MG/ML
1 VIAL (ML) INJECTION ONCE
Refills: 0 | Status: COMPLETED | OUTPATIENT
Start: 2023-02-19 | End: 2023-02-19

## 2023-02-19 RX ADMIN — Medication 100 GRAM(S): at 12:07

## 2023-02-19 RX ADMIN — SIMETHICONE 80 MILLIGRAM(S): 80 TABLET, CHEWABLE ORAL at 22:44

## 2023-02-19 RX ADMIN — Medication 125 MILLILITER(S): at 15:39

## 2023-02-19 RX ADMIN — Medication 1000 MILLIGRAM(S): at 00:24

## 2023-02-19 RX ADMIN — SIMETHICONE 80 MILLIGRAM(S): 80 TABLET, CHEWABLE ORAL at 12:07

## 2023-02-19 NOTE — BRIEF OPERATIVE NOTE - NSICDXBRIEFPREOP_GEN_ALL_CORE_FT
PRE-OP DIAGNOSIS:  Endometriosis of the pelvic peritoneum, other specified sites, unspecified depth 17-Feb-2023 15:27:02  Gildardo Baker  
PRE-OP DIAGNOSIS:  Endometriosis of the pelvic peritoneum, other specified sites, unspecified depth 17-Feb-2023 15:27:02  Gildardo Baker  Abnormal uterine and vaginal bleeding 17-Feb-2023 15:27:21  Gildardo Baker  
no

## 2023-02-19 NOTE — PROGRESS NOTE ADULT - ASSESSMENT
31yo  s/p RA endo excision, myomectomy of 2cm fibroid, incidental colpotomy and repair, cervical bx, LAR with primary anastomosis.    -Recommend removal of Young catheter  -Recommend simethicone 80 mg q6, standing  -Encourage ambulation    Care per primary team. GYN will continue to follow.

## 2023-02-19 NOTE — PROGRESS NOTE ADULT - ASSESSMENT
32F PMH endometriosis with rectal implants, PSHx myomectomy presents for endometriosis excision, myomectomy, low anterior resection (2/18). Tachycardic to 130s in PACU that resolved to low 100s with IVF resuscitation. Pain poorly controlled this AM    CLD/IVF   Monitor tachycardia  PCA for pain control  SCD/ Holding HSQ  IS/OOBA  Dc sue   32F PMH endometriosis with rectal implants, PSHx myomectomy presents for endometriosis excision, myomectomy, low anterior resection (2/18). Tachycardic to 130s in PACU that resolved to low 100s with IVF resuscitation. Pain poorly controlled this AM    CLD/IVF   Monitor tachycardia  PCA for pain control  SCD/ Holding HSQ  IS/OOBA  Dc rogers  US abd/pelvis

## 2023-02-20 RX ORDER — HYDROMORPHONE HYDROCHLORIDE 2 MG/ML
30 INJECTION INTRAMUSCULAR; INTRAVENOUS; SUBCUTANEOUS
Refills: 0 | Status: DISCONTINUED | OUTPATIENT
Start: 2023-02-20 | End: 2023-02-21

## 2023-02-20 RX ORDER — DIAZEPAM 5 MG
5 TABLET ORAL EVERY 8 HOURS
Refills: 0 | Status: DISCONTINUED | OUTPATIENT
Start: 2023-02-20 | End: 2023-02-21

## 2023-02-20 RX ORDER — LORATADINE 10 MG/1
10 TABLET ORAL ONCE
Refills: 0 | Status: COMPLETED | OUTPATIENT
Start: 2023-02-20 | End: 2023-02-20

## 2023-02-20 RX ORDER — SODIUM CHLORIDE 9 MG/ML
500 INJECTION INTRAMUSCULAR; INTRAVENOUS; SUBCUTANEOUS ONCE
Refills: 0 | Status: COMPLETED | OUTPATIENT
Start: 2023-02-19 | End: 2023-02-19

## 2023-02-20 RX ORDER — DIAZEPAM 5 MG
5 TABLET ORAL ONCE
Refills: 0 | Status: DISCONTINUED | OUTPATIENT
Start: 2023-02-20 | End: 2023-02-20

## 2023-02-20 RX ADMIN — SODIUM CHLORIDE 40 MILLILITER(S): 9 INJECTION, SOLUTION INTRAVENOUS at 07:39

## 2023-02-20 RX ADMIN — Medication 1000 MILLIGRAM(S): at 07:24

## 2023-02-20 RX ADMIN — SODIUM CHLORIDE 1000 MILLILITER(S): 9 INJECTION INTRAMUSCULAR; INTRAVENOUS; SUBCUTANEOUS at 00:13

## 2023-02-20 RX ADMIN — Medication 1000 MILLIGRAM(S): at 01:14

## 2023-02-20 RX ADMIN — Medication 1000 MILLIGRAM(S): at 13:53

## 2023-02-20 RX ADMIN — HYDROMORPHONE HYDROCHLORIDE 30 MILLILITER(S): 2 INJECTION INTRAMUSCULAR; INTRAVENOUS; SUBCUTANEOUS at 14:18

## 2023-02-20 RX ADMIN — SODIUM CHLORIDE 80 MILLILITER(S): 9 INJECTION, SOLUTION INTRAVENOUS at 01:07

## 2023-02-20 NOTE — DISCHARGE NOTE PROVIDER - NSDCFUADDINST_GEN_ALL_CORE_FT
Please follow a low fiber diet while at home:  - vegetables should be initially cooked (baked, steamed, blanched)  - you may want to start with peeled and/or canned fruit  - please limit fat/oil intake and greasy/fried foods  - add small amounts of fiber into diet every couple of days    General Discharge Instructions:  Please resume all regular home medications unless specifically advised not to take a particular medication. Also, please take any new medications as prescribed.  Please get plenty of rest, continue to ambulate several times per day, and drink adequate amounts of fluids. Avoid lifting weights greater than 5-10 lbs until you follow-up with your surgeon, who will instruct you further regarding activity restrictions.  Avoid driving or operating heavy machinery while taking pain medications.  Please follow-up with your surgeon and Primary Care Provider (PCP) as advised.  Incision Care:  *Please call your doctor if you have increased pain, swelling, redness, or drainage from the incision site.  *Avoid swimming and baths until your follow-up appointment.  *You may shower, and wash surgical incisions with a mild soap and warm water. Gently pat the area dry.    Warning Signs:  Please call your doctor if you experience the following:  *You experience new chest pain, pressure, squeezing or tightness.  *New or worsening cough, shortness of breath, or wheeze.  *If you are vomiting and cannot keep down fluids or your medications.  *You are getting dehydrated due to continued vomiting, diarrhea, or other reasons. Signs of dehydration include dry mouth, rapid heartbeat, or feeling dizzy or faint when standing.  *You see blood or dark/black material when you vomit or have a bowel movement.  *You experience burning when you urinate, have blood in your urine, or experience a discharge.  *Your pain is not improving within 8-12 hours or is not gone within 24 hours. Call or return immediately if your pain is getting worse, changes location, or moves to your chest or back.  *You have shaking chills, or fever greater than 101.5 degrees Fahrenheit or 38 degrees Celsius.  *Any change in your symptoms, or any new symptoms that concern you.

## 2023-02-20 NOTE — PROGRESS NOTE ADULT - ATTENDING COMMENTS
Significant cramping relieved by passing flatus and small liquid BM.  Was tachycardic during these episodes.  Tolerating clears  Now HR <100 Afebrile  voiding well  Incisions clean  Mild distention    labs ok    A/P: POD 3 lap LAR, excision of vaginal and pelvic endometriosis.  1. Low residue diet  2. HLIV  3. Ambulation & IS encouraged  4. Off SQH and Toradol for vaginal bleeding  5. Taking Tylenol; keep PCA, though not using it much.  6. Does not need abdominal binder.  7. Regional bed

## 2023-02-20 NOTE — DISCHARGE NOTE PROVIDER - HOSPITAL COURSE
32F PMH endometriosis with rectal implants, PSHx myomectomy presents for endometriosis excision, myomectomy, low anterior resection.  2/17: ROSA laparoscopic endometriosis excision, myomectomy, cervical biopsy, low anterior resection    32F PMH endometriosis with rectal implants, PSHx myomectomy presents for endometriosis excision, myomectomy, low anterior resection.  On 2/17, pt underwent Robotic assisted laparoscopic endometriosis excision, myomectomy, cervical biopsy, low anterior resection. Procedure was uncomplicated. During Post op pt had some vaginal bleeding which GYN team started WNL and did not pursue and intervention. Pt tolerated PO intake with return of bowel function, voided adequately and remained hemodynamically stable. At time of discharge pt was stable.    32F with PMHx of endometriosis with rectal implants and PSHx myomectomy presents for elective endometriosis excision, myomectomy, low anterior resection on 2/17. She had no acute complaints on presentation.  On 2/17, she underwent Robotic assisted laparoscopic endometriosis excision, myomectomy, cervical biopsy, low anterior resection. Procedure was uncomplicated. During Post op pt had some vaginal bleeding which GYN team started WNL and did not pursue and intervention. Pt tolerated PO intake with return of bowel function, voided adequately and remained hemodynamically stable. At time of discharge pt was stable.

## 2023-02-20 NOTE — DISCHARGE NOTE PROVIDER - CARE PROVIDER_API CALL
Justin Maradiaga)  ColonRectal Surgery; Surgery  [address], [address]  [place] [address]  Phone: [phone]  Fax: [phone]  Follow Up Time:     María Vela)  Obstetrics and Gynecology  [address], [address]  [place] [address]  Phone: [phone]  Fax: [phone]  Follow Up Time:

## 2023-02-20 NOTE — PROGRESS NOTE ADULT - ASSESSMENT
31yo  POD3 s/p RA endo excision, myomectomy of 2cm fibroid, incidental colpotomy and repair, cervical bx, LAR with primary anastomosis. Patient meeting post-operative milestones.    -Patient reports avoidance of PCA pump due to concern for constipation; consider transitioning to PO pain medications  -Consider blood transfusion for symptomatic anemia (tachycardia)    Care per primary team. GYN will continue to follow.

## 2023-02-20 NOTE — DISCHARGE NOTE PROVIDER - NSDCCPTREATMENT_GEN_ALL_CORE_FT
PRINCIPAL PROCEDURE  Procedure: Robot-assisted laparoscopic excision of endometriosis  Findings and Treatment:       SECONDARY PROCEDURE  Procedure: Robot-assisted low anterior resection of bowel  Findings and Treatment:

## 2023-02-20 NOTE — DISCHARGE NOTE PROVIDER - NSDCMRMEDTOKEN_GEN_ALL_CORE_FT
Colace 100 mg oral capsule: 1 cap(s) orally 2 times a day, As Needed -for constipation  -for constipation   Oxaydo 5 mg oral tablet: 1 tab(s) orally every 6 hours, As Needed -for severe pain MDD:4 tabs

## 2023-02-20 NOTE — CHART NOTE - NSCHARTNOTEFT_GEN_A_CORE
Patient hx of tachycardia during current hospital admission. Nurse notified provider that patient is tachycardic to 120s, other vss, (120/68, 99.9F oral, 99% O2 RA) and asymptomatic 2/19/2023 @ 21:13. Patient assessed and examined at bedside, c/o gas pains -F/-Bm. Abdominal exam ND, TTP Right side midline, +guarding, no rebound. PCA available. Patient has minimal PO intake due to gas pains, -n/-v. OOBA and IS use. During ambulation or coughing tachycardic to 140s bpm, resolving with rest to 110s bpm. Patient received 500cc bolus, simethicone x1, ekg with sinus tachy, 11pm cbc with hgb 7.6 (7.6), and IVF increased to 80cc/hr. Patient was monitored throughout night, +f/+bm liquidly and minimal blood per rectum during BM. Patient endorses gas pain relief with BM, Tachycardia improved to 103bpm 2/20/2023 2:50am. Chief aware.   All patient and nursing concerns addressed, interventions provided. Gyn following patient. Will continue to monitor. Nursing team informed to update provider for any clinical changes.    This writer will sign out to the next shift.

## 2023-02-20 NOTE — DISCHARGE NOTE PROVIDER - NSDCCPCAREPLAN_GEN_ALL_CORE_FT
PRINCIPAL DISCHARGE DIAGNOSIS  Diagnosis: Endometriosis  Assessment and Plan of Treatment: Follow up with Dr. Maradiaga and Dr. Vela in 2-3  weeks. Call the office at the number below to schedule your appointment. You may shower; soap and water over incision sites. Do not scrub. Pat dry when done. No tub bathing or swimming until cleared. Keep incision sites out of the sun as scars will darken. Ambulate as tolerated, but no heavy lifting (>10lbs) or strenuous exercise. You should be on low fiber diet. You should be urinating at least 3-4x per day. Call the office if you experience increasing abdominal pain, nausea, vomiting, or temperature >101 F.

## 2023-02-20 NOTE — DISCHARGE NOTE PROVIDER - NSDCFUADDAPPT_GEN_ALL_CORE_FT
Please follow up with Dr. Maradiaga and Dr. Vela upon discharge. You can follow up with them in 2-3 weeks. Please call office with any questions/concerns.

## 2023-02-20 NOTE — PROGRESS NOTE ADULT - ASSESSMENT
32F PMH asthma, endometriosis with rectal implants, PSHx myomectomy presents for endometriosis excision, myomectomy, low anterior resection (2/18). Tachycardic to 130s in PACU that resolved to low 100s with IVF resuscitation. Now POD3 and pain improving after having bowel movement.    Adv to LRD/IVF  Monitor tachycardia  PCA for pain control  SCD/ Holding HSQ  Step down to regional  Valium 5 q8  IS/OOBA   32F PMH asthma, endometriosis with rectal implants, PSHx myomectomy presents for endometriosis excision, myomectomy, low anterior resection (2/18). Tachycardic to 130s in PACU that resolved to low 100s with IVF resuscitation. Now POD3 and pain improving after having bowel movement.    Adv to LRD  IVHL  Monitor tachycardia  PCA for pain control  SCD/ Holding HSQ  Step down to regional  Valium 5 q8  IS/OOBA

## 2023-02-21 VITALS
SYSTOLIC BLOOD PRESSURE: 116 MMHG | RESPIRATION RATE: 16 BRPM | TEMPERATURE: 98 F | HEART RATE: 100 BPM | OXYGEN SATURATION: 100 % | DIASTOLIC BLOOD PRESSURE: 74 MMHG

## 2023-02-21 PROCEDURE — 88307 TISSUE EXAM BY PATHOLOGIST: CPT

## 2023-02-21 PROCEDURE — 88305 TISSUE EXAM BY PATHOLOGIST: CPT

## 2023-02-21 PROCEDURE — 82962 GLUCOSE BLOOD TEST: CPT

## 2023-02-21 PROCEDURE — 76705 ECHO EXAM OF ABDOMEN: CPT

## 2023-02-21 PROCEDURE — 85610 PROTHROMBIN TIME: CPT

## 2023-02-21 PROCEDURE — 85730 THROMBOPLASTIN TIME PARTIAL: CPT

## 2023-02-21 PROCEDURE — C1889: CPT

## 2023-02-21 PROCEDURE — 86901 BLOOD TYPING SEROLOGIC RH(D): CPT

## 2023-02-21 PROCEDURE — 83735 ASSAY OF MAGNESIUM: CPT

## 2023-02-21 PROCEDURE — 88304 TISSUE EXAM BY PATHOLOGIST: CPT

## 2023-02-21 PROCEDURE — P9045: CPT

## 2023-02-21 PROCEDURE — 86850 RBC ANTIBODY SCREEN: CPT

## 2023-02-21 PROCEDURE — 85027 COMPLETE CBC AUTOMATED: CPT

## 2023-02-21 PROCEDURE — 36415 COLL VENOUS BLD VENIPUNCTURE: CPT

## 2023-02-21 PROCEDURE — S2900: CPT

## 2023-02-21 PROCEDURE — 80048 BASIC METABOLIC PNL TOTAL CA: CPT

## 2023-02-21 PROCEDURE — 93005 ELECTROCARDIOGRAM TRACING: CPT

## 2023-02-21 PROCEDURE — 86900 BLOOD TYPING SEROLOGIC ABO: CPT

## 2023-02-21 PROCEDURE — 84100 ASSAY OF PHOSPHORUS: CPT

## 2023-02-21 RX ORDER — OXYCODONE HYDROCHLORIDE 5 MG/1
5 TABLET ORAL EVERY 6 HOURS
Refills: 0 | Status: DISCONTINUED | OUTPATIENT
Start: 2023-02-21 | End: 2023-02-21

## 2023-02-21 RX ORDER — OXYCODONE HYDROCHLORIDE 5 MG/1
1 TABLET ORAL
Qty: 20 | Refills: 0
Start: 2023-02-21 | End: 2023-02-25

## 2023-02-21 RX ORDER — DOCUSATE SODIUM 100 MG
1 CAPSULE ORAL
Qty: 14 | Refills: 0
Start: 2023-02-21 | End: 2023-02-27

## 2023-02-21 RX ADMIN — Medication 1000 MILLIGRAM(S): at 01:43

## 2023-02-21 RX ADMIN — Medication 1000 MILLIGRAM(S): at 12:51

## 2023-02-21 NOTE — DISCHARGE NOTE NURSING/CASE MANAGEMENT/SOCIAL WORK - PATIENT PORTAL LINK FT
You can access the FollowMyHealth Patient Portal offered by [facility] by registering at the following website: [email or web address]. By joining [email or web address] FollowMyHealth portal, you will also be able to view your health information using other applications (apps) compatible with our system.

## 2023-02-21 NOTE — PROGRESS NOTE ADULT - ASSESSMENT
32F PMH asthma, endometriosis with rectal implants, PSHx myomectomy presents for endometriosis excision, myomectomy, low anterior resection (2/18). Tachycardic to 130s in PACU that resolved to low 100s with IVF resuscitation. Now POD3 and pain improving after having bowel movement. Clinically improved and ready for discharge home.    LRD  D/C PCA  Monitor tachycardia  Pain/Nausea control PRN  SCD/ Holding HSQ  IS/OOBA  DISPO: D/C Home today

## 2023-02-21 NOTE — PROGRESS NOTE ADULT - ASSESSMENT
31yo  POD4 s/p RA endo excision, myomectomy of 2cm fibroid, incidental colpotomy and repair, cervical bx, LAR with primary anastomosis. Patient meeting post-operative milestones.    -Patient reports avoidance of PCA pump due to concern for constipation; consider transitioning to PO pain medications    Care per primary team. GYN will continue to follow. 33yo  POD4 s/p RA endo excision, myomectomy of 2cm fibroid, incidental colpotomy and repair, cervical bx, LAR with primary anastomosis. Patient meeting post-operative milestones.    -Patient reports avoidance of PCA pump due to concern for constipation; consider transitioning to PO pain medications  - OK to d/c from GYN perspective    Care per primary team. GYN will continue to follow.

## 2023-02-21 NOTE — PROGRESS NOTE ADULT - SUBJECTIVE AND OBJECTIVE BOX
Patient evaluated at bedside. She reports pain worsened overnight and she has barely passed flatus. She has not ambulated yet due to the discomfort of the Young catheter. She also reports bleeding, unsure if it is vaginal or rectal. She denies HA, dizziness, SOB, CP, palpitations, n/v. Tolerating clears.    T(C): 37.3 (02-19-23 @ 05:30), Max: 37.3 (02-19-23 @ 05:30)  HR: 105 (02-19-23 @ 05:30) (98 - 109)  BP: 119/69 (02-19-23 @ 05:30) (110/58 - 119/69)  RR: 18 (02-19-23 @ 05:30) (18 - 18)  SpO2: 99% (02-19-23 @ 05:30) (98% - 100%)    GA: well appearing  Resp: normal respiratory effort  Abd: +BS, soft, appropriately tender, minimally distended, no rebound or guarding, incisions clean/ dry/ intact  Extrem: SCDs in place, no LE edema       02-18 @ 07:01  -  02-19 @ 07:00  --------------------------------------------------------  IN: 520 mL / OUT: 1500 mL / NET: -980 mL                              7.8    11.18 )-----------( 411      ( 18 Feb 2023 11:28 )             26.3     02-18    141  |  107  |  6<L>  ----------------------------<  86  4.0   |  26  |  0.59    Ca    8.6      18 Feb 2023 11:28  Phos  3.2     02-18  Mg     2.0     02-18      
STATUS POST:  Fulguration, ovary    Biopsy, cervical punch    Robot-assisted laparoscopic excision of endometriosis    Myomectomy, uterus, robot-assisted, laparoscopic, with cystoscopy    Robot-assisted low anterior resection of bowel    Exploration, wound, abdominal        POST OPERATIVE DAY #:     SUBJECTIVE:   Patient seen and examined on am rounds. Discomfort with catheter. Otherwise, no new complaints. -n-v-cp-sob.    Vital Signs Last 24 Hrs  T(C): 37 (19 Feb 2023 08:55), Max: 37.3 (19 Feb 2023 05:30)  T(F): 98.6 (19 Feb 2023 08:55), Max: 99.1 (19 Feb 2023 05:30)  HR: 123 (19 Feb 2023 08:55) (97 - 123)  BP: 108/62 (19 Feb 2023 08:55) (101/53 - 129/58)  BP(mean): --  RR: 16 (19 Feb 2023 08:55) (16 - 18)  SpO2: 100% (19 Feb 2023 08:55) (97% - 100%)    Parameters below as of 19 Feb 2023 08:55  Patient On (Oxygen Delivery Method): room air        I&O's Summary    18 Feb 2023 07:01  -  19 Feb 2023 07:00  --------------------------------------------------------  IN: 960 mL / OUT: 1500 mL / NET: -540 mL    19 Feb 2023 07:01  -  19 Feb 2023 11:53  --------------------------------------------------------  IN: 160 mL / OUT: 1 mL / NET: 159 mL        Physical Exam:  General Appearance: Appears well, NAD  Pulmonary: Nonlabored breathing, no respiratory distress  Cardiovascular: NSR  Abdomen: Soft, nondistended. Tender to palpation x4, flinches with approach. -r-g.  Extremities: WWP, SCD's in place     LABS:                        7.4    9.16  )-----------( 376      ( 19 Feb 2023 08:15 )             25.3     02-19    137  |  103  |  5<L>  ----------------------------<  78  4.0   |  26  |  0.55    Ca    8.6      19 Feb 2023 08:15  Phos  2.9     02-19  Mg     1.8     02-19          
Feels well. Very little pain.  Passing liquid BM and flatus. Tolerating liquids and some solid food.  Minor vaginal bleeding.  AFVSS  Abd soft, ND, NT.  Incisions clean    A/P: POD 4 Lap LAR, excision of vaginal endometriosis. Doing well.  1. d/c home, f/u with me and Dr. Vela 2-3 weeks.  2. Instructed on postop care.
Patient evaluated at bedside. She reports pain is better controlled today; dilaudid PCA record showed 2 pushes, 2 attempts. She denies HA, dizziness, SOB, CP, palpitations, n/v.  She is ambulating without assistance, tolerating clear liquid diet, passing flatus, and voiding spontaneously. She reports having a small, loose BM overnight.     T(C): 36.6 (02-20-23 @ 06:07), Max: 37.7 (02-19-23 @ 20:22)  HR: 98 (02-20-23 @ 06:07) (90 - 118)  BP: 110/67 (02-20-23 @ 06:07) (110/67 - 120/85)  RR: 18 (02-20-23 @ 06:07) (18 - 18)  SpO2: 100% (02-20-23 @ 06:07) (98% - 100%)    GA: well appearing  Resp: normal respiratory effort  Abd: +BS, soft, mildly tender to palpation, no rebound or guarding, incisions clean/ dry/ intact  Extrem: SCDs in place, no LE edema       02-19 @ 07:01  -  02-20 @ 07:00  --------------------------------------------------------  IN: 1830 mL / OUT: 800 mL / NET: 1030 mL                              7.6    10.33 )-----------( 389      ( 19 Feb 2023 22:21 )             25.7     02-19    137  |  103  |  5<L>  ----------------------------<  78  4.0   |  26  |  0.55    Ca    8.6      19 Feb 2023 08:15  Phos  2.9     02-19  Mg     1.8     02-19      
Pt seen and examined at bedside. Pt states mild abdominal pain. Pt non ambulating, tolerating diet, denies flatus,rogers.   Pt denies fever, chills, chest pain, SOB, nausea, vomiting, lightheadedness, dizziness.      T(F): 97.8 (02-18-23 @ 08:25), Max: 98.9 (02-18-23 @ 03:42)  HR: 104 (02-18-23 @ 08:25) (104 - 133)  BP: 113/57 (02-18-23 @ 08:25) (113/57 - 158/67)  RR: 20 (02-18-23 @ 08:25) (16 - 20)  SpO2: 100% (02-18-23 @ 08:25) (96% - 100%)  Wt(kg): --  I&O's Summary    17 Feb 2023 07:01  -  18 Feb 2023 07:00  --------------------------------------------------------  IN: 2000 mL / OUT: 1985 mL / NET: 15 mL        MEDICATIONS  (STANDING):  acetaminophen     Tablet .. 1000 milliGRAM(s) Oral every 6 hours  HYDROmorphone PCA (1 mG/mL) 30 milliLiter(s) PCA Continuous PCA Continuous  lactated ringers. 1000 milliLiter(s) (40 mL/Hr) IV Continuous <Continuous>    MEDICATIONS  (PRN):  ondansetron Injectable 4 milliGRAM(s) IV Push every 8 hours PRN Nausea and/or Vomiting      Physical Exam:  Constitutional: NAD  Pulmonary: no inc wob  Abdomen: incision site clean, dry, intact. Soft, mildly tender, non distended, no guarding, no rebound, + bowel sounds  Extremities: no lower extremity edema or calve tenderness. SCDs in place     LABS:                        8.1    12.98 )-----------( 443      ( 18 Feb 2023 03:13 )             27.4     02-17    137  |  102  |  9   ----------------------------<  181<H>  4.6   |  24  |  0.74    Ca    8.6      17 Feb 2023 19:13  Phos  4.0     02-17  Mg     1.8     02-17      PT/INR - ( 17 Feb 2023 07:10 )   PT: 13.0 sec;   INR: 1.09          PTT - ( 17 Feb 2023 07:10 )  PTT:33.7 sec      RADIOLOGY & ADDITIONAL TESTS:    
Pt seen and examined at bedside. Pt states minimal abdominal pain with PO tylenol, not using PCA overnight. Pt ambulating, tolerating low fiber diet, urinating adequately. Pt is passing flatus, pt had liquid BM overnight and this AM  Pt denies fever, chills, chest pain, SOB, nausea, vomiting, lightheadedness, dizziness.      T(F): 98.3 (02-21-23 @ 05:00), Max: 98.5 (02-20-23 @ 15:46)  HR: 100 (02-21-23 @ 05:00) (97 - 106)  BP: 111/73 (02-21-23 @ 05:00) (102/64 - 123/71)  RR: 17 (02-21-23 @ 05:00) (17 - 20)  SpO2: 100% (02-21-23 @ 05:00) (99% - 100%)    I&O's Summary    20 Feb 2023 07:01  -  21 Feb 2023 07:00  --------------------------------------------------------  IN: 0 mL / OUT: 1500 mL / NET: -1500 mL        MEDICATIONS  (STANDING):  acetaminophen     Tablet .. 1000 milliGRAM(s) Oral every 6 hours  HYDROmorphone PCA (1 mG/mL) 30 milliLiter(s) PCA Continuous PCA Continuous    MEDICATIONS  (PRN):  diazepam    Tablet 5 milliGRAM(s) Oral every 8 hours PRN anxiety  ondansetron Injectable 4 milliGRAM(s) IV Push every 8 hours PRN Nausea and/or Vomiting      Physical Exam:  Constitutional: NAD  Pulmonary: no increased work of breathing  Cardiovascular: Regular rate and rhythm   Abdomen: incision site clean, dry, intact. Soft, mildly tender, [] distended, no guarding, no rebound, [] bowel sounds  Extremities: no lower extremity edema or calf tenderness. SCDs in place     LABS:                        7.6    10.33 )-----------( 389      ( 19 Feb 2023 22:21 )             25.7     02-19    137  |  103  |  5<L>  ----------------------------<  78  4.0   |  26  |  0.55    Ca    8.6      19 Feb 2023 08:15  Phos  2.9     02-19  Mg     1.8     02-19          
STATUS POST:  Fulguration, ovary    Biopsy, cervical punch    Robot-assisted laparoscopic excision of endometriosis    Myomectomy, uterus, robot-assisted, laparoscopic, with cystoscopy    Robot-assisted low anterior resection of bowel    Exploration, wound, abdominal    POST OPERATIVE DAY #: 1    SUBJECTIVE:   Patient seen and examined at bedside with chief during AM rounds. Patient noted to be continuously tachycardic overnight with good urine output. Patient reports she has some significant abdominal pain this AM that is partially controlled with medication. Denies flatus or bowel movements since procedure. States she has tolerating minimal water since surgery, but denies nausea. Reports she has not ambulated secondary to rogers catheter.    Vital Signs Last 24 Hrs  T(C): 36.6 (18 Feb 2023 08:25), Max: 37.2 (18 Feb 2023 03:42)  T(F): 97.8 (18 Feb 2023 08:25), Max: 98.9 (18 Feb 2023 03:42)  HR: 104 (18 Feb 2023 08:25) (104 - 133)  BP: 113/57 (18 Feb 2023 08:25) (113/57 - 158/67)  BP(mean): 93 (17 Feb 2023 19:36) (90 - 102)  RR: 20 (18 Feb 2023 08:25) (16 - 20)  SpO2: 100% (18 Feb 2023 08:25) (96% - 100%)    Parameters below as of 18 Feb 2023 08:25  Patient On (Oxygen Delivery Method): room air        I&O's Summary    17 Feb 2023 07:01  -  18 Feb 2023 07:00  --------------------------------------------------------  IN: 2000 mL / OUT: 1985 mL / NET: 15 mL        Physical Exam:  General Appearance: Resting in bed, NAD  Pulmonary: Nonlabored breathing, no respiratory distress  Cardiovascular: NSR  Abdomen: Soft, mildly distended, moderate tenderness to palpation throughout, no rebound or guarding. Incisions clean, dry, intact with steri strips  : Rogers cathter in place   Extremities: WWP, SCD's in place     LABS:                        7.8    11.18 )-----------( 411      ( 18 Feb 2023 11:28 )             26.3     02-18    141  |  107  |  6<L>  ----------------------------<  86  4.0   |  26  |  0.59    Ca    8.6      18 Feb 2023 11:28  Phos  3.2     02-18  Mg     2.0     02-18      PT/INR - ( 17 Feb 2023 07:10 )   PT: 13.0 sec;   INR: 1.09          PTT - ( 17 Feb 2023 07:10 )  PTT:33.7 sec    
STATUS POST:  Low anterior resection of bowel    POST OPERATIVE DAY #: 3    SUBJECTIVE:   Patient seen and examined at bedside by chief during AM rounds. Overnight, patient noted to have tachycardia to 120s with complaints of gas pain. CBC at that time noted to be stable 7.6 (7.6). Patient reports this AM that after having a bowel movement yesterday the gas pain has much improved. States she is tolerating clear liquid diet well. Denies nausea/emesis. Reports she continues to pass flatus. Reports she has been ambulating. Asking when she can have more substantial food.    Vital Signs Last 24 Hrs  T(C): 36.6 (20 Feb 2023 06:07), Max: 37.7 (19 Feb 2023 20:22)  T(F): 97.9 (20 Feb 2023 06:07), Max: 99.9 (19 Feb 2023 20:22)  HR: 98 (20 Feb 2023 06:07) (90 - 123)  BP: 110/67 (20 Feb 2023 06:07) (97/65 - 120/85)  BP(mean): --  RR: 18 (20 Feb 2023 06:07) (16 - 18)  SpO2: 100% (20 Feb 2023 06:07) (98% - 100%)    Parameters below as of 20 Feb 2023 06:07  Patient On (Oxygen Delivery Method): room air        I&O's Summary    19 Feb 2023 07:01  -  20 Feb 2023 07:00  --------------------------------------------------------  IN: 1830 mL / OUT: 800 mL / NET: 1030 mL        Physical Exam:  General Appearance: Resting comfortably in bed, NAD  Pulmonary: Nonlabored breathing, no respiratory distress  Cardiovascular: Sinus rhythm - tachycardic   Abdomen: Soft, moderate distention, tender to palpation in LLQ, no rebound or guarding. Incisions clean, dry, intact  Extremities: WWP, SCD's in place     LABS:                        7.6    10.33 )-----------( 389      ( 19 Feb 2023 22:21 )             25.7     02-19    137  |  103  |  5<L>  ----------------------------<  78  4.0   |  26  |  0.55    Ca    8.6      19 Feb 2023 08:15  Phos  2.9     02-19  Mg     1.8     02-19          
STATUS POST:  Low anterior resection of bowel    POST OPERATIVE DAY #: 4    SUBJECTIVE:   Patient seen and examined at bedside. No acute events noted overnight. Patient reports she feels improved this AM. States pain is controlled. Reports she has not used PCA and that tylenol is enough to curb post surgical pain. Reports she is tolerating low residue diet well. Denies nausea, emesis. States she is passing flatus and having loose bowel movements. Reports she has ambulated. Patient also reported mild vaginal bleeding since procedure.     Vital Signs Last 24 Hrs  T(C): 36.8 (21 Feb 2023 05:00), Max: 36.9 (20 Feb 2023 15:46)  T(F): 98.3 (21 Feb 2023 05:00), Max: 98.5 (20 Feb 2023 15:46)  HR: 100 (21 Feb 2023 05:00) (97 - 106)  BP: 111/73 (21 Feb 2023 05:00) (102/64 - 123/71)  BP(mean): --  RR: 17 (21 Feb 2023 05:00) (17 - 20)  SpO2: 100% (21 Feb 2023 05:00) (99% - 100%)    Parameters below as of 21 Feb 2023 05:00  Patient On (Oxygen Delivery Method): room air        I&O's Summary    20 Feb 2023 07:01  -  21 Feb 2023 07:00  --------------------------------------------------------  IN: 0 mL / OUT: 1500 mL / NET: -1500 mL        Physical Exam:  General Appearance: Appears well, NAD  Pulmonary: Equal chest wall expansion b/l, no respiratory distress  Cardiovascular: NSR  Abdomen: Soft, mild distention - improved from prior, minor generalized tenderness to palpation. No rebound or guarding. Surgical incisions healing well  Extremities: WWP, SCD's in place, No edema    LABS:                        7.6    10.33 )-----------( 389      ( 19 Feb 2023 22:21 )             25.7               
Pt seen and examined at bedside. Pt complains of mild abdominal pain.   Pt denies any fever, chills, chest pain, SOB, nausea or vomiting     T(F): 98.1 (02-17-23 @ 21:13), Max: 98.1 (02-17-23 @ 21:13)  HR: 104 (02-17-23 @ 21:13) (104 - 133)  BP: 123/72 (02-17-23 @ 21:13) (109/70 - 158/67)  RR: 17 (02-17-23 @ 21:13) (16 - 18)  SpO2: 100% (02-17-23 @ 21:13) (99% - 100%)  Wt(kg): --    02-17 @ 07:01  -  02-18 @ 00:08  --------------------------------------------------------  IN: 1280 mL / OUT: 1085 mL / NET: 195 mL        HYDROmorphone  Injectable 0.4 milliGRAM(s) IV Push every 4 hours PRN Severe Pain (7 - 10)  HYDROmorphone  Injectable 0.2 milliGRAM(s) IV Push every 4 hours PRN Moderate Pain (4 - 6)  lactated ringers. 1000 milliLiter(s) IV Continuous <Continuous>  ondansetron Injectable 4 milliGRAM(s) IV Push every 8 hours PRN Nausea and/or Vomiting      Physical exam:  Constitutional: NAD  Abdomen: incision site clean, dry and intact. Soft, mildly tender, nondistended  Extremities: no lower extremity edema, or calve tenderness. SCDs in place

## 2023-02-21 NOTE — DISCHARGE NOTE NURSING/CASE MANAGEMENT/SOCIAL WORK - NSDCPEFALRISK_GEN_ALL_CORE
For information on Fall & Injury Prevention, visit: [email or web address] OR  [email or web address] OR  https://www.cdc.gov/steadi/patient.html

## 2023-02-23 LAB — SURGICAL PATHOLOGY STUDY: SIGNIFICANT CHANGE UP

## 2023-03-02 DIAGNOSIS — N80.109 ENDOMETRIOSIS OF OVARY, UNSPECIFIED SIDE, UNSPECIFIED DEPTH: ICD-10-CM

## 2023-03-02 DIAGNOSIS — R14.1 GAS PAIN: ICD-10-CM

## 2023-03-02 DIAGNOSIS — N80.30 ENDOMETRIOSIS OF PELVIC PERITONEUM, UNSPECIFIED: ICD-10-CM
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
## 2023-03-02 DIAGNOSIS — N80.519 ENDOMETRIOSIS OF THE RECTUM, UNSPECIFIED DEPTH: ICD-10-CM

## 2023-03-02 DIAGNOSIS — D25.9 LEIOMYOMA OF UTERUS, UNSPECIFIED: ICD-10-CM

## 2023-03-02 DIAGNOSIS — Z88.0 ALLERGY STATUS TO PENICILLIN: ICD-10-CM

## 2023-03-02 DIAGNOSIS — N80.C9: ICD-10-CM

## 2023-03-02 DIAGNOSIS — J45.909 UNSPECIFIED ASTHMA, UNCOMPLICATED: ICD-10-CM

## 2023-03-02 DIAGNOSIS — Q43.8 OTHER SPECIFIED CONGENITAL MALFORMATIONS OF INTESTINE: ICD-10-CM

## 2023-03-02 DIAGNOSIS — K62.4 STENOSIS OF ANUS AND RECTUM: ICD-10-CM

## 2023-03-02 DIAGNOSIS — N80.00 ENDOMETRIOSIS OF THE UTERUS, UNSPECIFIED: ICD-10-CM
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
## 2023-03-02 DIAGNOSIS — R00.0 TACHYCARDIA, UNSPECIFIED: ICD-10-CM

## 2023-03-02 DIAGNOSIS — D64.9 ANEMIA, UNSPECIFIED: ICD-10-CM

## 2023-03-02 DIAGNOSIS — N80.50: ICD-10-CM

## 2023-03-02 DIAGNOSIS — N99.61 INTRAOPERATIVE HEMORRHAGE AND HEMATOMA OF A GENITOURINARY SYSTEM ORGAN OR STRUCTURE COMPLICATING A GENITOURINARY SYSTEM PROCEDURE: ICD-10-CM

## 2023-03-02 DIAGNOSIS — K56.690 OTHER PARTIAL INTESTINAL OBSTRUCTION: ICD-10-CM

## 2023-03-02 DIAGNOSIS — N80.42 ENDOMETRIOSIS OF RECTOVAGINAL SEPTUM WITH INVOLVEMENT OF VAGINA: ICD-10-CM

## 2023-03-02 DIAGNOSIS — N80.A0 ENDOMETRIOSIS OF BLADDER, UNSPECIFIED DEPTH: ICD-10-CM

## 2023-03-02 DIAGNOSIS — N92.0 EXCESSIVE AND FREQUENT MENSTRUATION WITH REGULAR CYCLE: ICD-10-CM

## (undated) DEVICE — FOLEY CATH 2-WAY 24FR 30CC SILICONE

## (undated) DEVICE — ELCTR BOVIE PENCIL BLADE 10FT

## (undated) DEVICE — TROCAR COVIDIEN VERSAONE FIXATION CANNULA 12MM

## (undated) DEVICE — TROCAR COVIDIEN VERSAPORT BLADELESS OPTICAL 12MM STANDARD

## (undated) DEVICE — SUT VICRYL 2-0 18" TIES

## (undated) DEVICE — TUBING TUR 2 PRONG

## (undated) DEVICE — SUT VICRYL 0 27" UR-6

## (undated) DEVICE — WARMING BLANKET UPPER ADULT

## (undated) DEVICE — XI TIP COVER

## (undated) DEVICE — GLV 6.5 PROTEXIS (WHITE)

## (undated) DEVICE — GELPORT LAPAROSCOPIC SYSTEM

## (undated) DEVICE — SUT VICRYL 0 36" CT-1

## (undated) DEVICE — LIGASURE MARYLAND 37CM

## (undated) DEVICE — TROCAR COVIDIEN VERSAONE FIXATION CANNULA 5MM

## (undated) DEVICE — GLV 7 PROTEXIS (WHITE)

## (undated) DEVICE — D HELP - CLEARVIEW CLEARIFY SYSTEM

## (undated) DEVICE — XI ARM SCISSOR MONO CURVED

## (undated) DEVICE — XI ARM FORCEP PROGRASP 8MM

## (undated) DEVICE — XI STAPLER SUREFORM 60

## (undated) DEVICE — SUT PDS II 2-0 27" SH

## (undated) DEVICE — XI SEAL UNIV 5- 8 MM

## (undated) DEVICE — UTERINE MANIPULATOR CONMED VCARE DX

## (undated) DEVICE — GLV 7.5 PROTEXIS (WHITE)

## (undated) DEVICE — TROCAR COVIDIEN VERSAPORT BLADELESS OPTICAL 5MM STANDARD

## (undated) DEVICE — XI DRAPE ARM

## (undated) DEVICE — XI DRAPE COLUMN

## (undated) DEVICE — SUT MONOCRYL 4-0 18" PS-2

## (undated) DEVICE — SUT MONOCRYL 4-0 27" PS-2 UNDYED

## (undated) DEVICE — PACK PERI GYN

## (undated) DEVICE — XI OBTURATOR OPTICAL BLADELESS 8MM

## (undated) DEVICE — APPLICATOR RAYON PROCTO SWAB 16"

## (undated) DEVICE — XI ARM GRASPER BIPOLAR LONG 8MM

## (undated) DEVICE — FOLEY TRAY 16FR 5CC LF UMETER CLOSED

## (undated) DEVICE — TUBING CAP SET AIR AND WATER FOR OLYMPUS SCOPE 0.4M

## (undated) DEVICE — VENODYNE/SCD SLEEVE CALF MEDIUM

## (undated) DEVICE — SOL IRR BAG NS 0.9% 3000ML

## (undated) DEVICE — Device

## (undated) DEVICE — KIT SIGMOIDOSCOPE NO SWAB SGL USE

## (undated) DEVICE — SUT VICRYL 3-0 18" SH (POP-OFF)

## (undated) DEVICE — KIT ENDO PROCEDURE CUST W/VLV

## (undated) DEVICE — DRSG DERMABOND 0.7ML

## (undated) DEVICE — DRAPE TOWEL BLUE 17" X 24"

## (undated) DEVICE — STAPLER COVIDIEN ENDO GIA STANDARD HANDLE

## (undated) DEVICE — DRAPE IOBAN 13" X 13"

## (undated) DEVICE — PACK GENERAL LAPAROSCOPY

## (undated) DEVICE — DEVICE BULB BLADDER ASSEMBLY DISP

## (undated) DEVICE — SUT VLOC 180 2-0 9" GS-22 GREEN

## (undated) DEVICE — PACK GENERAL CLOSING

## (undated) DEVICE — POSITIONER PINK PAD PIGAZZI SYSTEM XL W ARM PROTECTOR